# Patient Record
Sex: MALE
[De-identification: names, ages, dates, MRNs, and addresses within clinical notes are randomized per-mention and may not be internally consistent; named-entity substitution may affect disease eponyms.]

---

## 2020-09-11 ENCOUNTER — TRANSCRIPTION ENCOUNTER (OUTPATIENT)
Age: 30
End: 2020-09-11

## 2020-11-06 PROBLEM — Z00.00 ENCOUNTER FOR PREVENTIVE HEALTH EXAMINATION: Status: ACTIVE | Noted: 2020-11-06

## 2020-11-09 ENCOUNTER — APPOINTMENT (OUTPATIENT)
Dept: NEUROLOGY | Facility: CLINIC | Age: 30
End: 2020-11-09

## 2020-11-30 ENCOUNTER — APPOINTMENT (OUTPATIENT)
Dept: NEUROLOGY | Facility: CLINIC | Age: 30
End: 2020-11-30
Payer: COMMERCIAL

## 2020-11-30 VITALS
BODY MASS INDEX: 28.11 KG/M2 | HEART RATE: 60 BPM | TEMPERATURE: 95.9 F | WEIGHT: 219 LBS | HEIGHT: 74 IN | SYSTOLIC BLOOD PRESSURE: 150 MMHG | DIASTOLIC BLOOD PRESSURE: 93 MMHG

## 2020-11-30 DIAGNOSIS — Z82.49 FAMILY HISTORY OF ISCHEMIC HEART DISEASE AND OTHER DISEASES OF THE CIRCULATORY SYSTEM: ICD-10-CM

## 2020-11-30 DIAGNOSIS — Z86.718 PERSONAL HISTORY OF OTHER VENOUS THROMBOSIS AND EMBOLISM: ICD-10-CM

## 2020-11-30 DIAGNOSIS — R42 DIZZINESS AND GIDDINESS: ICD-10-CM

## 2020-11-30 PROCEDURE — 99072 ADDL SUPL MATRL&STAF TM PHE: CPT

## 2020-11-30 PROCEDURE — 99204 OFFICE O/P NEW MOD 45 MIN: CPT

## 2020-11-30 NOTE — PHYSICAL EXAM
[Heart Sounds] : normal S1 and S2 [FreeTextEntry1] : Mental Status: AxOx3, speech: no dysarthria, anxious, attention normal by reversal of months, serial sevens, and spell “world” backwards\par \par STM 3/3 immediate, 3/3 at five minutes,\par \par CN: visual acuity, VFF, blink to confrontation\par \par III, IV, VI, PERRL, EOMI\par \par V sensation normal to light touch, pinprick\par \par VII normal squint vs resistance, normal smile, face symmetric\par \par VIII: normal hearing to voice \par \par IX, X normal gag, symmetric palate, uvula raises midline\par \par XI normal shrug versus resistance and lateralization of head versus resistance\par \par XII tongue symmetric, normal strength \par \par Sensory: normal to LT, pinprick and vibration \par \par Reflexes\par \par Brachoradialis, biceps, triceps 1+ , patella 2+ , ankle 1+ b/l \par \par Plantar flexor response bilaterally\par \par Coordination: no dysmetria on FNF \par \par Gait : normal balance and gait, no ataxic movements

## 2020-11-30 NOTE — ASSESSMENT
[FreeTextEntry1] : Referral to primary care - will need thyroid function tests, EKG \par If unrevealing, may benefit from psychiatric referral, CBT, SSRI? \par Basic labs \par Will defer imaging for now- non-focal exam\par If dizziness persists, will consider MRI and vestibular rehab, rule out BPPV \par Return to clinic in three months

## 2020-11-30 NOTE — REVIEW OF SYSTEMS
[Lightheadedness] : lightheadedness [Negative] : Heme/Lymph [Sleep Disturbances] : sleep disturbances [Anxiety] : anxiety [Decr. Concentrating Ability] : decreased concentrating ability [Dizziness] : dizziness [Palpitations] : palpitations [Fever] : no fever [Chills] : no chills [Facial Weakness] : no facial weakness [Arm Weakness] : no arm weakness [Hand Weakness] : no hand weakness [Leg Weakness] : no leg weakness [Difficulty Writing] : no difficulty writing [Difficulties in Speech] : no speech difficulties [Eye Pain] : no eye pain [Earache] : no earache [Chest Pain] : no chest pain [Abdominal Pain] : no abdominal pain [Dysuria] : no dysuria [Joint Pain] : no joint pain [Skin Lesions] : no skin lesions [Muscle Weakness] : no muscle weakness [Easy Bleeding] : no tendency for easy bleeding

## 2020-11-30 NOTE — HISTORY OF PRESENT ILLNESS
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 He is presenting for the first time to neurology clinic due to a constellation of symptoms starting in August 2020. 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 They were sometimes associated with mild headaches. EKG was NSR without NORAH or ectopy. He was discharged with follow-up with neurology and primary care. \par \par Overall, he reports fatigue, weakness, anxiety, irregular heartbeat, dizziness, and feeling off balance for the past few months. These occurred in setting of BAR exam.   He took it a couple weeks ago but still feels very anxious. He is nervous he failed the exam. His thoughts are always racing. He gets ~ 7 hours of sleep a night and tries to exercise daily. \par \par With regard to light-headedness, this is not worse when standing or with head position. No ringing in the ears, no nausea or vomiting.  Dizziness lasts seconds and then resolves.  He does not have a primary care doctor. Has insight that symptoms may be related to anxiety. \par \par Allergies: Xarelto, dizziness \par \par Family History: \par Mother: htn\par Father: htn \par \par Medications: \par GNC multivitamin \par \par Social History: \par Lives with girlfriend, no cigarettes, occasional alcohol (once a month), no drugs. 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 \par \par \par \par

## 2020-12-04 LAB
25(OH)D3 SERPL-MCNC: 41 NG/ML
ALBUMIN SERPL ELPH-MCNC: 4.4 G/DL
ALP BLD-CCNC: 66 U/L
ALT SERPL-CCNC: 17 U/L
ANION GAP SERPL CALC-SCNC: 8 MMOL/L
AST SERPL-CCNC: 21 U/L
BASOPHILS # BLD AUTO: 0.04 K/UL
BASOPHILS NFR BLD AUTO: 1.1 %
BILIRUB SERPL-MCNC: 0.2 MG/DL
BUN SERPL-MCNC: 18 MG/DL
CALCIUM SERPL-MCNC: 9.2 MG/DL
CHLORIDE SERPL-SCNC: 105 MMOL/L
CO2 SERPL-SCNC: 27 MMOL/L
CREAT SERPL-MCNC: 1.04 MG/DL
EOSINOPHIL # BLD AUTO: 0.1 K/UL
EOSINOPHIL NFR BLD AUTO: 2.6 %
GLUCOSE SERPL-MCNC: 91 MG/DL
HCT VFR BLD CALC: 44.5 %
HGB BLD-MCNC: 14.5 G/DL
IMM GRANULOCYTES NFR BLD AUTO: 0 %
LYMPHOCYTES # BLD AUTO: 1.63 K/UL
LYMPHOCYTES NFR BLD AUTO: 43.1 %
MAN DIFF?: NORMAL
MCHC RBC-ENTMCNC: 30.5 PG
MCHC RBC-ENTMCNC: 32.6 GM/DL
MCV RBC AUTO: 93.7 FL
MONOCYTES # BLD AUTO: 0.46 K/UL
MONOCYTES NFR BLD AUTO: 12.2 %
NEUTROPHILS # BLD AUTO: 1.55 K/UL
NEUTROPHILS NFR BLD AUTO: 41 %
PLATELET # BLD AUTO: 214 K/UL
POTASSIUM SERPL-SCNC: 4.3 MMOL/L
PROT SERPL-MCNC: 7.2 G/DL
RBC # BLD: 4.75 M/UL
RBC # FLD: 14.8 %
SODIUM SERPL-SCNC: 139 MMOL/L
TSH SERPL-ACNC: 3.14 UIU/ML
VIT B12 SERPL-MCNC: 953 PG/ML
WBC # FLD AUTO: 3.78 K/UL

## 2020-12-16 DIAGNOSIS — F32.9 ANXIETY DISORDER, UNSPECIFIED: ICD-10-CM

## 2020-12-16 DIAGNOSIS — F41.9 ANXIETY DISORDER, UNSPECIFIED: ICD-10-CM

## 2020-12-16 RX ORDER — SERTRALINE HYDROCHLORIDE 50 MG/1
50 TABLET, FILM COATED ORAL
Qty: 60 | Refills: 2 | Status: ACTIVE | COMMUNITY
Start: 2020-12-16 | End: 1900-01-01

## 2020-12-21 RX ORDER — SERTRALINE HYDROCHLORIDE 100 MG/1
100 TABLET, FILM COATED ORAL DAILY
Qty: 30 | Refills: 0 | Status: ACTIVE | COMMUNITY
Start: 2020-12-21 | End: 1900-01-01

## 2021-03-01 ENCOUNTER — APPOINTMENT (OUTPATIENT)
Dept: NEUROLOGY | Facility: CLINIC | Age: 31
End: 2021-03-01